# Patient Record
Sex: MALE | Race: WHITE | NOT HISPANIC OR LATINO
[De-identification: names, ages, dates, MRNs, and addresses within clinical notes are randomized per-mention and may not be internally consistent; named-entity substitution may affect disease eponyms.]

---

## 2023-05-01 PROBLEM — Z00.00 ENCOUNTER FOR PREVENTIVE HEALTH EXAMINATION: Status: ACTIVE | Noted: 2023-05-01

## 2023-05-03 ENCOUNTER — APPOINTMENT (OUTPATIENT)
Dept: ORTHOPEDIC SURGERY | Facility: CLINIC | Age: 68
End: 2023-05-03
Payer: MEDICARE

## 2023-05-03 DIAGNOSIS — Z78.9 OTHER SPECIFIED HEALTH STATUS: ICD-10-CM

## 2023-05-03 DIAGNOSIS — S46.012A STRAIN OF MUSCLE(S) AND TENDON(S) OF THE ROTATOR CUFF OF LEFT SHOULDER, INITIAL ENCOUNTER: ICD-10-CM

## 2023-05-03 DIAGNOSIS — S42.292S OTHER DISPLACED FRACTURE OF UPPER END OF LEFT HUMERUS, SEQUELA: ICD-10-CM

## 2023-05-03 DIAGNOSIS — S52.532S COLLES' FRACTURE OF LEFT RADIUS, SEQUELA: ICD-10-CM

## 2023-05-03 PROCEDURE — 99204 OFFICE O/P NEW MOD 45 MIN: CPT

## 2023-05-03 RX ORDER — RILUZOLE 50 MG/1
TABLET ORAL
Refills: 0 | Status: ACTIVE | COMMUNITY

## 2023-05-03 NOTE — HISTORY OF PRESENT ILLNESS
[de-identified] : Patient reports a fall at the beginning of the year in the garden. He was treated in [place] at the time for both a wrist and shoulder fracture. He was subsequently diagnosed with ALS, which he has been treating with PT, acupuncture, medications and HEP. He is here to discuss continuing PT for ROM and strengthening exercises.

## 2023-05-03 NOTE — PHYSICAL EXAM
[4 ___] : forward flexion 4[unfilled]/5 [Outside films reviewed] : Outside films reviewed [There are no fractures, subluxations or dislocations. No significant abnormalities are seen] : There are no fractures, subluxations or dislocations. No significant abnormalities are seen [de-identified] : active abduction 80 degrees [Left] : left hand [3___] : volarflexion 3[unfilled]/5 [4___] : grasp 4[unfilled]/5 [5___] : pinch 5[unfilled]/5 [] : good capillary refill in all fingers [The fracture is in acceptable alignment. There is progression in healing seen] : The fracture is in acceptable alignment. There is progression in healing seen [TWNoteComboBox7] : dorsiflexion 50 degrees [TWNoteComboBox4] : volarflexion 50 degrees

## 2023-06-14 ENCOUNTER — APPOINTMENT (OUTPATIENT)
Dept: ORTHOPEDIC SURGERY | Facility: CLINIC | Age: 68
End: 2023-06-14